# Patient Record
Sex: FEMALE | Race: WHITE | NOT HISPANIC OR LATINO | Employment: FULL TIME | URBAN - NONMETROPOLITAN AREA
[De-identification: names, ages, dates, MRNs, and addresses within clinical notes are randomized per-mention and may not be internally consistent; named-entity substitution may affect disease eponyms.]

---

## 2017-01-11 ENCOUNTER — TELEPHONE (OUTPATIENT)
Dept: FAMILY MEDICINE CLINIC | Facility: CLINIC | Age: 47
End: 2017-01-11

## 2017-01-11 DIAGNOSIS — I10 ESSENTIAL HYPERTENSION: Primary | ICD-10-CM

## 2017-01-11 DIAGNOSIS — G47.9 SLEEP DISORDER: ICD-10-CM

## 2017-01-11 RX ORDER — CLONAZEPAM 0.5 MG/1
0.5 TABLET ORAL DAILY PRN
Qty: 30 TABLET | Refills: 2 | OUTPATIENT
Start: 2017-01-11 | End: 2017-08-22 | Stop reason: SDUPTHER

## 2017-01-11 RX ORDER — LISINOPRIL AND HYDROCHLOROTHIAZIDE 25; 20 MG/1; MG/1
1 TABLET ORAL DAILY
Qty: 30 TABLET | Refills: 0 | Status: SHIPPED | OUTPATIENT
Start: 2017-01-11 | End: 2017-02-21 | Stop reason: SDUPTHER

## 2017-01-11 NOTE — TELEPHONE ENCOUNTER
----- Message from Elizabeth Walker MA sent at 1/11/2017  1:58 PM EST -----  Regarding: FW: Prescription Question  Contact: [phone]   I sent in refill on the Lisinopril. What about clonazepam?  ----- Message -----     From: Aye Aguilar     Sent: 1/11/2017   1:52 PM       To: [facility] [place]  Subject: Prescription Question                            Would you mind to refill my Lisinopril-HCTZ 20-25 and clonazepam 0.5 mg at [facility]. I will reschedule my appointment for next week before Deshawn goes back to the hospital.      Thanks!   Aye

## 2017-02-21 ENCOUNTER — OFFICE VISIT (OUTPATIENT)
Dept: FAMILY MEDICINE CLINIC | Facility: CLINIC | Age: 47
End: 2017-02-21

## 2017-02-21 VITALS
BODY MASS INDEX: 30.53 KG/M2 | HEART RATE: 78 BPM | HEIGHT: 66 IN | WEIGHT: 190 LBS | OXYGEN SATURATION: 99 % | SYSTOLIC BLOOD PRESSURE: 100 MMHG | DIASTOLIC BLOOD PRESSURE: 78 MMHG

## 2017-02-21 DIAGNOSIS — M19.90 INFLAMMATORY ARTHRITIS: ICD-10-CM

## 2017-02-21 DIAGNOSIS — F32.89 OTHER DEPRESSION: ICD-10-CM

## 2017-02-21 DIAGNOSIS — I10 ESSENTIAL HYPERTENSION: ICD-10-CM

## 2017-02-21 DIAGNOSIS — M79.7 FIBROMYALGIA: ICD-10-CM

## 2017-02-21 DIAGNOSIS — M79.641 RIGHT HAND PAIN: Primary | ICD-10-CM

## 2017-02-21 DIAGNOSIS — E66.9 ADIPOSITY: ICD-10-CM

## 2017-02-21 DIAGNOSIS — M25.531 RIGHT WRIST PAIN: ICD-10-CM

## 2017-02-21 PROCEDURE — 99214 OFFICE O/P EST MOD 30 MIN: CPT | Performed by: FAMILY MEDICINE

## 2017-02-21 RX ORDER — TOPIRAMATE 25 MG/1
75 CAPSULE, EXTENDED RELEASE ORAL DAILY
Qty: 90 EACH | Refills: 2 | Status: SHIPPED | OUTPATIENT
Start: 2017-02-21 | End: 2017-02-24

## 2017-02-21 RX ORDER — LISINOPRIL AND HYDROCHLOROTHIAZIDE 25; 20 MG/1; MG/1
1 TABLET ORAL DAILY
Qty: 30 TABLET | Refills: 5 | Status: SHIPPED | OUTPATIENT
Start: 2017-02-21 | End: 2017-09-28 | Stop reason: SDUPTHER

## 2017-02-21 RX ORDER — TOPIRAMATE 25 MG/1
1 CAPSULE, EXTENDED RELEASE ORAL DAILY
Qty: 30 CAPSULE | Refills: 5
Start: 2017-02-21 | End: 2017-02-21

## 2017-02-21 RX ORDER — PHENTERMINE HYDROCHLORIDE 37.5 MG/1
37.5 TABLET ORAL
Qty: 30 TABLET | Refills: 1 | Status: SHIPPED | OUTPATIENT
Start: 2017-02-21 | End: 2017-07-07

## 2017-02-21 NOTE — PROGRESS NOTES
Subjective   Aye Aguilar is a 46 y.o. female.     Hand Pain    The incident occurred 12 to 24 hours ago. The incident occurred at home. There was no injury mechanism. The pain is present in the right hand and right wrist. The quality of the pain is described as burning and aching. The pain radiates to the right arm. The pain is severe. The pain has been constant since the incident. Pertinent negatives include no chest pain, muscle weakness, numbness or tingling. The symptoms are aggravated by movement (gripping). She has tried immobilization and NSAIDs for the symptoms. The treatment provided no relief.     Fibromyalgia: Patient here for follow up on fibromyalgia. Patient has a several year history of soft tissue pain: diffuse. severity: fairly severe: course over time: waxing and waning but worse overall.  fatigue: severe: course over time: waxing and waning but worse overall.  depression: moderate: course over time: waxing and waning but better overall.  anxiety: moderate: course over time: waxing and waning but better overall.  headaches: severe: course over time: waxing and waning.  paresthesias: generally extermitites: severity: tolerable: course over time: waxing and waning.  IBS symptoms: severity: mild to moderate: course over time: waxing and waning but better overall.  psychosocial stressors: daughter, parent with chronic illness. Onset was gradual. The symptoms are of varying severity. They are made worse by: cold exposure and overuse. They are helped by arthritis medications, rest and current meds. The patient denies fever, rash, localizing neurologic symptoms, unexplained weight loss. Previous treatments include prescription meds - see below, Physical Therapy and Massage Therapy. Associated symptoms include alopecia, arthralgia, depression, fatigue, joint pain, memory loss, morning stiffness and muscle weakness. Patient denies associated bleeding/clotting problems, fevers, new headache, nodules, oral  ulcers, pleurisy and seizures. Evaluation to date includes extensive labs, rheumatology consult, imaging, etc. Recent interventions include no recent changes in regimen.. Limitation on activities include intermittent problems with ADLs, exercise, job performance. Patient is frequently absent from work per her report. SHe is sleeping poorly, having RLS symptoms. Moods fluctuate. Stress reaction to son's recent dx of cancer. Doing some better with pain since starting low dose gabapentin. Also currently on Cymbalta, tramadol. She has comorbid vit D def for which she is taking high dose replacement.     Hypertension: Patient here for follow-up of elevated blood pressure. She is not exercising and is fairly adherent to low salt diet. Blood pressure is well controlled at home. Cardiac symptoms fatigue and lower extremity edema. Patient denies chest pain, claudication, cough, dyspnea, exertional chest pressure/discomfort, irregular heart beat, near-syncope, palpitations and weight gain. Cardiovascular risk factors: obesity (BMI >= 30 kg/m2) and sedentary lifestyle. Use of agents associated with hypertension: estrogens and NSAIDS. History of target organ damage: none. She is taking meds as rx'd; denies side effects.    Depression: Patient here for f/u of depression. She has previously struggled with anhedonia, depressed mood, difficulty concentrating, fatigue, hopelessness, hypersomnia, impaired memory, insomnia, psychomotor retardation and weight gain. Onset was approximately several years ago, gradually improving since that time. She denies current suicidal and homicidal plan or intent. Family history significant for anxiety and depression.Possible organic causes contributing are: medications, endocrine/metabolic, neuro, nutritional. Risk factors: positive family history in  , negative life event   and previous episode of depression. She is currnelty on Cymbalta and elavil. She complains of the following side effects from  "the treatment: none.  Increased difficulty due to son's illness.      Obesity (Brief): The patient is being seen for a routine clinic follow-up of obesity. The last clinic visit was 2 month(s) ago. Symptoms: excess weight and difficulty trying to lose weight. Symptoms are waxing/waning. Exacerbating factors: stress, sugar cravings and fast foods. Relieving factors: exercise, adequate water, support activities and preparing foods. Associated symptoms: dyspnea, fatigue, edema, back pain, joint pain, depressed mood and anxiety. Current treatment includes portion control, exercise regimen, behavior modification and Trokendi, Phentermine. By report, there is good tolerance of treatment, good symptom control and Good compliance with medication, poor compliance with exercise. Fair compliance with diet. Marked psychosocial stressors. Associated conditions include abdominal obesity and suspected JUVENTINO. Disease complications: hypertension. Reported interest in weight loss is high. Diets tried in the past include low calorie and low fat. The patient exercises infrequently. Exercise includes walking. Denies side effects of current medication. Pt struggling with environmental mngt. Pleased that she is \"not thinking about food all the time\". More water intake, increased activity, better overall portion control. Working on stress mgnt.     The following portions of the patient's history were reviewed and updated as appropriate: allergies, current medications, past family history, past medical history, past social history, past surgical history and problem list.    Review of Systems   Constitutional: Positive for fatigue. Negative for chills and fever.   HENT: Negative for congestion, mouth sores, nosebleeds, rhinorrhea, sore throat and trouble swallowing.         Dry mouth   Eyes: Negative for pain, itching and visual disturbance.        Dry eyes   Respiratory: Negative.  Negative for cough, shortness of breath and wheezing.  "   Cardiovascular: Positive for leg swelling. Negative for chest pain and palpitations.   Gastrointestinal: Negative for abdominal pain, constipation, diarrhea, nausea and vomiting.   Endocrine: Positive for heat intolerance. Negative for polydipsia and polyuria.   Genitourinary: Negative.    Musculoskeletal: Positive for arthralgias and myalgias.   Skin: Negative for rash and wound.   Neurological: Positive for headaches. Negative for dizziness, tingling, tremors, seizures, syncope, weakness and numbness.   Hematological: Negative.    Psychiatric/Behavioral: Positive for dysphoric mood and sleep disturbance. Negative for confusion and suicidal ideas. The patient is nervous/anxious.      Objective    Vitals:    02/21/17 1127   BP: 100/78   Pulse: 78   SpO2: 99%     Body mass index is 30.67 kg/(m^2).  Last 2 weights    02/21/17  1127   Weight: 190 lb (86.2 kg)     Physical Exam   Constitutional: She is oriented to person, place, and time. She appears well-developed and well-nourished. She is cooperative. She does not appear ill. No distress.   HENT:   Mouth/Throat: Mucous membranes are normal. Mucous membranes are not dry.   Eyes: Conjunctivae and lids are normal.   Cardiovascular: Normal rate, regular rhythm and intact distal pulses.    Pulmonary/Chest: Effort normal and breath sounds normal.   Musculoskeletal:        Right hand: She exhibits decreased range of motion (limited , most notable in thumb), tenderness (diffuse soft tissue TTPin palm, 1st digit), bony tenderness (1st MCP) and swelling (mild). She exhibits normal capillary refill. Normal sensation noted. Decreased strength (due to pain) noted.       Vascular Status -  Her exam exhibits no right foot edema. Her exam exhibits no left foot edema.  Lymphadenopathy:     She has no cervical adenopathy.   Neurological: She is alert and oriented to person, place, and time. She has normal strength. She displays no tremor. No sensory deficit. Gait normal.   Skin:  Skin is warm and dry. No bruising and no rash noted.   Psychiatric: Her behavior is normal. Thought content normal. She exhibits a depressed mood.   Nursing note and vitals reviewed.    Most recent labs reviewed on chart.    Assessment/Plan   Aye was seen today for hand pain.    Diagnoses and all orders for this visit:    Right hand pain    Essential hypertension  -     lisinopril-hydrochlorothiazide (PRINZIDE,ZESTORETIC) 20-25 MG per tablet; Take 1 tablet by mouth Daily.    Adiposity  -     Discontinue: Topiramate ER 25 MG capsule sustained-release 24 hr; Take 1 capsule by mouth Daily. In addition to 50 mg capsule for total daily dose of 75 mg  -     phentermine (ADIPEX-P) 37.5 MG tablet; Take 1 tablet by mouth Daily. Alternate dosin/2 tablet by mouth at 10 AM and 1/2 at 3 PM.    Right wrist pain    Inflammatory arthritis    Fibromyalgia    Other depression    Other orders  -     Topiramate ER 25 MG capsule extended-release 24 hour sprinkle; Take 75 mg by mouth Daily.    Right hand/wrist pain of unclear etiology as not assoc'd with injury, repetitive use, etc. Suspect OA vs inflammatory tenosynovitis. She declines tx with corticosteroids. Will tx conservatively with ice/heat, NSAID, gentle ROM exercise. If minimal improvement over next 2-3 days, consider imaging.  HTN well controlled. Check 2-3 times per week, reported hypotension, orthostatic symptoms, etc.  FMS stable.  Depression with some worsening due to new psychosocial stressors. Managing well.  Need for cont'd lifestyle modification. Denies side effects from current meds.  Goals reviewed including: mainly water to drink, limit white flour/processed sugar, high protein, high fiber carbs, good breakfast, working toward 150 mins cardio per week, weight training 2x/week.  Encouraged compliance with Vit D.  Continue Trokendi to 75 mg qd.  F/U with Dr. Nguyen and Dr. Lopez as scheduled  [facility] report reviewed and scanned into chart. Last [facility] date  12/16/16.  As part of patient's treatment plan I am prescribing a controlled substance. The patient has been made aware of the appropriate use of such medications, including potential risk of somnolence, limited ability to drive and/or work safely, and potential for dependence and/or overdose. It has also been made clear that these medications are for use by this patient only, without concomitant use of alcohol or other substances, unless prescribed.  F/U in 2 months, sooner as needed.   Patient was encouraged to keep me informed of any acute changes, lack of improvement, or any new concerning symptoms.  Patient voiced understanding of all instructions and denied further questions.

## 2017-02-24 RX ORDER — TOPIRAMATE 25 MG/1
25 TABLET ORAL 2 TIMES DAILY
Qty: 60 TABLET | Refills: 2 | Status: SHIPPED | OUTPATIENT
Start: 2017-02-24

## 2017-03-20 RX ORDER — DULOXETIN HYDROCHLORIDE 60 MG/1
CAPSULE, DELAYED RELEASE ORAL
Qty: 60 CAPSULE | Refills: 5 | Status: SHIPPED | OUTPATIENT
Start: 2017-03-20 | End: 2017-09-28 | Stop reason: SDUPTHER

## 2017-07-06 ENCOUNTER — OFFICE VISIT (OUTPATIENT)
Dept: FAMILY MEDICINE CLINIC | Facility: CLINIC | Age: 47
End: 2017-07-06

## 2017-07-06 VITALS
OXYGEN SATURATION: 98 % | SYSTOLIC BLOOD PRESSURE: 122 MMHG | WEIGHT: 195 LBS | BODY MASS INDEX: 31.34 KG/M2 | DIASTOLIC BLOOD PRESSURE: 80 MMHG | HEIGHT: 66 IN | HEART RATE: 86 BPM

## 2017-07-06 DIAGNOSIS — M54.50 ACUTE RIGHT-SIDED LOW BACK PAIN WITHOUT SCIATICA: Primary | ICD-10-CM

## 2017-07-06 DIAGNOSIS — M79.7 FIBROMYALGIA: ICD-10-CM

## 2017-07-06 DIAGNOSIS — M19.90 INFLAMMATORY ARTHRITIS: ICD-10-CM

## 2017-07-06 DIAGNOSIS — E55.9 VITAMIN D DEFICIENCY: ICD-10-CM

## 2017-07-06 DIAGNOSIS — M62.838 MUSCLE SPASM: ICD-10-CM

## 2017-07-06 DIAGNOSIS — I10 ESSENTIAL HYPERTENSION: ICD-10-CM

## 2017-07-06 LAB
25(OH)D3+25(OH)D2 SERPL-MCNC: 23 NG/ML
ALBUMIN SERPL-MCNC: 4.4 G/DL (ref 3.5–5)
ALBUMIN/GLOB SERPL: 1.7 G/DL (ref 1–2)
ALP SERPL-CCNC: 100 U/L (ref 38–126)
ALT SERPL-CCNC: 21 U/L (ref 13–69)
AST SERPL-CCNC: 13 U/L (ref 15–46)
BILIRUB SERPL-MCNC: 0.4 MG/DL (ref 0.2–1.3)
BUN SERPL-MCNC: 13 MG/DL (ref 7–20)
BUN/CREAT SERPL: 18.6 (ref 7.1–23.5)
CALCIUM SERPL-MCNC: 10.4 MG/DL (ref 8.4–10.2)
CHLORIDE SERPL-SCNC: 98 MMOL/L (ref 98–107)
CO2 SERPL-SCNC: 30 MMOL/L (ref 26–30)
CREAT SERPL-MCNC: 0.7 MG/DL (ref 0.6–1.3)
CRP SERPL-MCNC: 2.9 MG/DL (ref 0–1)
ERYTHROCYTE [DISTWIDTH] IN BLOOD BY AUTOMATED COUNT: 14.5 % (ref 11.5–14.5)
GLOBULIN SER CALC-MCNC: 2.6 GM/DL
GLUCOSE SERPL-MCNC: 80 MG/DL (ref 74–98)
HCT VFR BLD AUTO: 39.6 % (ref 37–47)
HGB BLD-MCNC: 12.4 G/DL (ref 12–16)
MCH RBC QN AUTO: 27.9 PG (ref 27–31)
MCHC RBC AUTO-ENTMCNC: 31.3 G/DL (ref 30–37)
MCV RBC AUTO: 89 FL (ref 81–99)
PLATELET # BLD AUTO: 426 10*3/MM3 (ref 130–400)
POTASSIUM SERPL-SCNC: 3.7 MMOL/L (ref 3.5–5.1)
PROT SERPL-MCNC: 7 G/DL (ref 6.3–8.2)
RBC # BLD AUTO: 4.45 10*6/MM3 (ref 4.2–5.4)
SODIUM SERPL-SCNC: 139 MMOL/L (ref 137–145)
TSH SERPL DL<=0.005 MIU/L-ACNC: 2.65 MIU/ML (ref 0.47–4.68)
WBC # BLD AUTO: 7.91 10*3/MM3 (ref 4.8–10.8)

## 2017-07-06 PROCEDURE — 96372 THER/PROPH/DIAG INJ SC/IM: CPT | Performed by: FAMILY MEDICINE

## 2017-07-06 PROCEDURE — 99214 OFFICE O/P EST MOD 30 MIN: CPT | Performed by: FAMILY MEDICINE

## 2017-07-06 RX ORDER — METHYLPREDNISOLONE ACETATE 80 MG/ML
120 INJECTION, SUSPENSION INTRA-ARTICULAR; INTRALESIONAL; INTRAMUSCULAR; SOFT TISSUE ONCE
Status: COMPLETED | OUTPATIENT
Start: 2017-07-06 | End: 2017-07-06

## 2017-07-06 RX ORDER — METHOCARBAMOL 500 MG/1
TABLET, FILM COATED ORAL
COMMUNITY
Start: 2017-04-10

## 2017-07-06 RX ORDER — KETOROLAC TROMETHAMINE 30 MG/ML
60 INJECTION, SOLUTION INTRAMUSCULAR; INTRAVENOUS ONCE
Status: COMPLETED | OUTPATIENT
Start: 2017-07-06 | End: 2017-07-06

## 2017-07-06 RX ADMIN — METHYLPREDNISOLONE ACETATE 120 MG: 80 INJECTION, SUSPENSION INTRA-ARTICULAR; INTRALESIONAL; INTRAMUSCULAR; SOFT TISSUE at 12:37

## 2017-07-06 RX ADMIN — KETOROLAC TROMETHAMINE 60 MG: 30 INJECTION, SOLUTION INTRAMUSCULAR; INTRAVENOUS at 12:37

## 2017-07-06 NOTE — PROGRESS NOTES
Subjective   Aye Aguilar is a 46 y.o. female.     Back Pain   This is a new problem. The current episode started in the past 7 days. The problem occurs constantly. The problem has been waxing and waning (acutely worse over past 24-48 hrs) since onset. The pain is present in the lumbar spine (right side). The quality of the pain is described as aching. The pain does not radiate. The pain is severe. The symptoms are aggravated by sitting (change in position). Stiffness is present all day. Associated symptoms include headaches (chronic). Pertinent negatives include no abdominal pain, bladder incontinence, bowel incontinence, chest pain, dysuria, fever, leg pain, numbness, paresthesias, weakness or weight loss. Risk factors include sedentary lifestyle. She has tried NSAIDs, muscle relaxant, ice, heat, bed rest and analgesics for the symptoms. The treatment provided mild relief.   She has comorbidities including FMSx, inflammatory arthritis, vit D def. She feels she is having more muscle spasms in general.    Has h/o HTN. Recently controlled. Taking med as rx'd. Is on thiazide diuretic. Potassium previously low normal.    The following portions of the patient's history were reviewed and updated as appropriate: allergies, current medications, past family history, past medical history, past social history, past surgical history and problem list.    Review of Systems   Constitutional: Positive for fatigue. Negative for fever, unexpected weight change and weight loss.   Respiratory: Negative.    Cardiovascular: Negative.  Negative for chest pain.   Gastrointestinal: Negative for abdominal pain and bowel incontinence.   Genitourinary: Negative for bladder incontinence, dysuria, frequency, hematuria and urgency.   Musculoskeletal: Positive for arthralgias, back pain and myalgias.   Skin: Negative for rash.   Neurological: Positive for headaches (chronic). Negative for weakness, numbness and paresthesias.    Psychiatric/Behavioral: Positive for sleep disturbance.       Objective    Vitals:    07/06/17 1150   BP: 122/80   Pulse: 86   SpO2: 98%     Body mass index is 31.47 kg/(m^2).  Last 2 weights    07/06/17  1150   Weight: 195 lb (88.5 kg)       Physical Exam   Constitutional: She is oriented to person, place, and time. She appears well-developed and well-nourished. She is cooperative. She does not appear ill. She appears distressed (mildly due to pain).   HENT:   Mouth/Throat: Mucous membranes are normal. Mucous membranes are not dry.   Eyes: Conjunctivae and lids are normal.   Neck: Phonation normal. Neck supple. No thyroid mass and no thyromegaly present.   Cardiovascular: Normal rate, regular rhythm and intact distal pulses.    Pulmonary/Chest: Effort normal and breath sounds normal.   Musculoskeletal:        Lumbar back: She exhibits decreased range of motion, tenderness (diffuse soft tissues right side), bony tenderness (mildly L1-L3) and spasm. She exhibits no deformity.        Back:        Vascular Status -  Her exam exhibits no right foot edema. Her exam exhibits no left foot edema.  Lymphadenopathy:     She has no cervical adenopathy.   Neurological: She is alert and oriented to person, place, and time. She has normal reflexes. She displays no tremor. No sensory deficit. Gait normal.   Neg SLR bilaterally. 5/5 strength throughout except RLE with plantar flexion on right foot- due to pain more than actual weakness   Skin: Skin is warm and dry. No rash noted.   Psychiatric: She has a normal mood and affect. Her behavior is normal.   Nursing note and vitals reviewed.      Assessment/Plan   Aye was seen today for back pain.    Diagnoses and all orders for this visit:    Acute right-sided low back pain without sciatica  -     C-reactive Protein  -     methylPREDNISolone acetate (DEPO-medrol) injection 120 mg; Inject 1.5 mL into the shoulder, thigh, or buttocks 1 (One) Time.  -     ketorolac (TORADOL)  injection 60 mg; Inject 60 mg into the shoulder, thigh, or buttocks 1 (One) Time.    Essential hypertension  Comments:  Controlled. continue current medication. Low salt diet and increased exercise advised.  Orders:  -     CBC (No Diff)  -     Comprehensive Metabolic Panel  -     TSH    Vitamin D deficiency  Comments:  Unknown status. Adjust tx as indicated.  Orders:  -     Vitamin D 25 Hydroxy    Fibromyalgia    Inflammatory arthritis  -     CBC (No Diff)  -     Comprehensive Metabolic Panel  -     C-reactive Protein  -     methylPREDNISolone acetate (DEPO-medrol) injection 120 mg; Inject 1.5 mL into the shoulder, thigh, or buttocks 1 (One) Time.  -     ketorolac (TORADOL) injection 60 mg; Inject 60 mg into the shoulder, thigh, or buttocks 1 (One) Time.    Muscle spasm  -     Comprehensive Metabolic Panel  -     TSH  -     CK  -     methylPREDNISolone acetate (DEPO-medrol) injection 120 mg; Inject 1.5 mL into the shoulder, thigh, or buttocks 1 (One) Time.  -     ketorolac (TORADOL) injection 60 mg; Inject 60 mg into the shoulder, thigh, or buttocks 1 (One) Time.    I will contact patient regarding test results and provide instructions regarding any necessary changes in plan of care.  Continue conservative mngt of back pain.  Patient was encouraged to keep me informed of any acute changes, lack of improvement, or any new concerning symptoms.  Pt is aware of reasons to seek emergent care.  F/U with Dr. Nguyen and neurology as scheduled.  Patient voiced understanding of all instructions and denied further questions.

## 2017-08-22 DIAGNOSIS — G47.9 SLEEP DISORDER: ICD-10-CM

## 2017-08-22 RX ORDER — CLONAZEPAM 0.5 MG/1
0.5 TABLET ORAL DAILY PRN
Qty: 30 TABLET | Refills: 2 | OUTPATIENT
Start: 2017-08-22 | End: 2017-11-29 | Stop reason: SDUPTHER

## 2017-09-28 ENCOUNTER — OFFICE VISIT (OUTPATIENT)
Dept: FAMILY MEDICINE CLINIC | Facility: CLINIC | Age: 47
End: 2017-09-28

## 2017-09-28 VITALS
HEIGHT: 66 IN | SYSTOLIC BLOOD PRESSURE: 118 MMHG | WEIGHT: 202 LBS | HEART RATE: 76 BPM | BODY MASS INDEX: 32.47 KG/M2 | DIASTOLIC BLOOD PRESSURE: 80 MMHG | OXYGEN SATURATION: 98 %

## 2017-09-28 DIAGNOSIS — R60.1 GENERALIZED EDEMA: ICD-10-CM

## 2017-09-28 DIAGNOSIS — Z13.6 ENCOUNTER FOR LIPID SCREENING FOR CARDIOVASCULAR DISEASE: ICD-10-CM

## 2017-09-28 DIAGNOSIS — I10 ESSENTIAL HYPERTENSION: ICD-10-CM

## 2017-09-28 DIAGNOSIS — F32.89 OTHER DEPRESSION: ICD-10-CM

## 2017-09-28 DIAGNOSIS — N95.9 MENOPAUSAL AND POSTMENOPAUSAL DISORDER: ICD-10-CM

## 2017-09-28 DIAGNOSIS — Z13.220 ENCOUNTER FOR LIPID SCREENING FOR CARDIOVASCULAR DISEASE: ICD-10-CM

## 2017-09-28 DIAGNOSIS — Z23 NEED FOR INFLUENZA VACCINATION: ICD-10-CM

## 2017-09-28 DIAGNOSIS — E61.1 IRON DEFICIENCY: ICD-10-CM

## 2017-09-28 DIAGNOSIS — N95.1 POST MENOPAUSAL SYNDROME: ICD-10-CM

## 2017-09-28 DIAGNOSIS — E66.9 ADIPOSITY: Primary | ICD-10-CM

## 2017-09-28 DIAGNOSIS — E55.9 VITAMIN D DEFICIENCY: ICD-10-CM

## 2017-09-28 DIAGNOSIS — M79.7 FIBROMYALGIA: ICD-10-CM

## 2017-09-28 LAB
CHOLEST SERPL-MCNC: 181 MG/DL (ref 0–199)
HDLC SERPL-MCNC: 62 MG/DL (ref 40–60)
IRON SERPL-MCNC: 36 MCG/DL (ref 37–181)
LDLC SERPL CALC-MCNC: 102 MG/DL (ref 0–99)
TRIGL SERPL-MCNC: 83 MG/DL
VLDLC SERPL CALC-MCNC: 16.6 MG/DL

## 2017-09-28 PROCEDURE — 90471 IMMUNIZATION ADMIN: CPT | Performed by: FAMILY MEDICINE

## 2017-09-28 PROCEDURE — 90686 IIV4 VACC NO PRSV 0.5 ML IM: CPT | Performed by: FAMILY MEDICINE

## 2017-09-28 PROCEDURE — 99214 OFFICE O/P EST MOD 30 MIN: CPT | Performed by: FAMILY MEDICINE

## 2017-09-28 RX ORDER — LISINOPRIL AND HYDROCHLOROTHIAZIDE 25; 20 MG/1; MG/1
1 TABLET ORAL DAILY
Qty: 30 TABLET | Refills: 0 | Status: SHIPPED | OUTPATIENT
Start: 2017-09-28 | End: 2017-09-28 | Stop reason: SDUPTHER

## 2017-09-28 RX ORDER — DULOXETIN HYDROCHLORIDE 60 MG/1
CAPSULE, DELAYED RELEASE ORAL
Qty: 60 CAPSULE | Refills: 0 | Status: SHIPPED | OUTPATIENT
Start: 2017-09-28 | End: 2017-09-28 | Stop reason: SDUPTHER

## 2017-09-28 RX ORDER — LISINOPRIL AND HYDROCHLOROTHIAZIDE 25; 20 MG/1; MG/1
1 TABLET ORAL DAILY
Qty: 90 TABLET | Refills: 1 | Status: SHIPPED | OUTPATIENT
Start: 2017-09-28

## 2017-09-28 RX ORDER — ESTRADIOL 0.5 MG/1
0.5 TABLET ORAL
Qty: 90 TABLET | Refills: 1 | Status: SHIPPED | OUTPATIENT
Start: 2017-09-28 | End: 2017-10-02 | Stop reason: SDUPTHER

## 2017-09-28 RX ORDER — DULOXETIN HYDROCHLORIDE 60 MG/1
CAPSULE, DELAYED RELEASE ORAL
Qty: 180 CAPSULE | Refills: 1 | Status: SHIPPED | OUTPATIENT
Start: 2017-09-28

## 2017-09-28 RX ORDER — PHENTERMINE HYDROCHLORIDE 37.5 MG/1
37.5 TABLET ORAL
Qty: 30 TABLET | Refills: 0 | Status: SHIPPED | OUTPATIENT
Start: 2017-09-28 | End: 2017-11-29 | Stop reason: SDUPTHER

## 2017-09-28 RX ORDER — FUROSEMIDE 20 MG/1
TABLET ORAL
Qty: 30 TABLET | Refills: 0 | Status: SHIPPED | OUTPATIENT
Start: 2017-09-28

## 2017-09-28 NOTE — PROGRESS NOTES
Subjective   Aye Aguilar is a 46 y.o. female.     History of Present Illness   Mrs. Aguilar is here today for f/u on multiple chronic problems.  Fibromyalgia: Patient here for follow up on fibromyalgia. Patient has a several year history of soft tissue pain: diffuse. severity: fairly severe: course over time: waxing and waning but worse overall.  fatigue: severe: course over time: waxing and waning but worse overall.  depression: moderate: course over time: waxing and waning but better overall.  anxiety: moderate: course over time: waxing and waning but better overall.  headaches: severe: course over time: waxing and waning.  paresthesias: generally extermitites: severity: tolerable: course over time: waxing and waning.  IBS symptoms: severity: mild to moderate: course over time: waxing and waning but better overall.  psychosocial stressors: daughter, parent with chronic illness. Onset was gradual. The symptoms are of varying severity. They are made worse by: cold exposure and overuse. They are helped by arthritis medications, rest and current meds. The patient denies fever, rash, localizing neurologic symptoms, unexplained weight loss. Previous treatments include prescription meds - see below, Physical Therapy and Massage Therapy. Associated symptoms include alopecia, arthralgia, depression, fatigue, joint pain, memory loss, morning stiffness and muscle weakness. Patient denies associated bleeding/clotting problems, fevers, new headache, nodules, oral ulcers, pleurisy and seizures. Evaluation to date includes extensive labs, rheumatology consult, imaging, etc. Recent interventions include no recent changes in regimen.. Limitation on activities include intermittent problems with ADLs, exercise, job performance. Patient is frequently absent from work per her report. SHe is sleeping poorly, having RLS symptoms. Moods fluctuate. Doing some better with pain since starting low dose gabapentin. Also currently on  Cymbalta, tramadol. She has comorbid vit D def for which she is taking high dose replacement.      Hypertension: Patient here for follow-up of elevated blood pressure. She is not exercising and is fairly adherent to low salt diet. Blood pressure is well controlled at home. Cardiac symptoms fatigue and lower extremity edema. Patient denies chest pain, claudication, cough, dyspnea, exertional chest pressure/discomfort, irregular heart beat, near-syncope, palpitations and weight gain. Cardiovascular risk factors: obesity (BMI >= 30 kg/m2) and sedentary lifestyle. Use of agents associated with hypertension: estrogens and NSAIDS. History of target organ damage: none. She is taking meds as rx'd; denies side effects.     Depression: Patient here for f/u of depression. She has previously struggled with anhedonia, depressed mood, difficulty concentrating, fatigue, hopelessness, hypersomnia, impaired memory, insomnia, psychomotor retardation and weight gain. Onset was approximately several years ago, gradually improving since that time. She denies current suicidal and homicidal plan or intent. Family history significant for anxiety and depression.Possible organic causes contributing are: medications, endocrine/metabolic, neuro, nutritional. Risk factors: positive family history in  , negative life event   and previous episode of depression. She is currnelty on Cymbalta and elavil. She complains of the following side effects from the treatment: none.  Increased difficulty due to son's illness.       Obesity (Brief): The patient is being seen for a routine clinic follow-up of obesity. The last clinic visit was 2 month(s) ago. Symptoms: excess weight and difficulty trying to lose weight. Symptoms are waxing/waning. Exacerbating factors: stress, sugar cravings and fast foods. Relieving factors: exercise, adequate water, support activities and preparing foods. Associated symptoms: dyspnea, fatigue, edema, back pain, joint  pain, depressed mood and anxiety. Current treatment includes portion control, exercise regimen, behavior modification and Trokendi. She has taken phentermine in past. By report, there is good tolerance of treatment, poor symptom control and Good compliance with medication, poor compliance with exercise. Fair compliance with diet. Marked psychosocial stressors. Associated conditions include abdominal obesity and suspected JUVENTINO. Disease complications: hypertension. Reported interest in weight loss is high. Diets tried in the past include low calorie and low fat. The patient exercises infrequently. Exercise includes walking. Denies side effects of current medication. Pt struggling with environmental mngt. More water intake, decreased activity with change in job. Working on stress mgnt. She would like to restart phentermine.    Hormone Replacement Counseling: Patient presents to discuss hormone replacement therapy. The patient is taking hormone replacement therapy.  Patient denies post-menopausal vaginal bleeding. The patient is sexually active. Patient is hormone deficient due to hysterectomy with BSO.  The patient currently has symptoms of anxiety, depression, dry skin, moodiness, no energy. Symptoms in past had included same but symptoms have improved.  Current hormone therapy:   Estrogen: estradiol 0.5 mg   Has h/o iron def anemia; due for recheck.     The following portions of the patient's history were reviewed and updated as appropriate: allergies, current medications, past family history, past medical history, past social history, past surgical history and problem list.    Review of Systems   Constitutional: Positive for fatigue and unexpected weight change. Negative for chills and fever.   HENT: Negative for congestion, mouth sores, nosebleeds, rhinorrhea, sore throat and trouble swallowing.         Dry mouth   Eyes: Negative for pain, itching and visual disturbance.        Dry eyes   Respiratory: Negative.   Negative for cough, shortness of breath and wheezing.    Cardiovascular: Positive for leg swelling. Negative for chest pain and palpitations.   Gastrointestinal: Negative for abdominal pain, constipation, diarrhea, nausea and vomiting.   Endocrine: Positive for heat intolerance. Negative for polydipsia and polyuria.   Genitourinary: Negative.    Musculoskeletal: Positive for arthralgias and myalgias.   Skin: Negative for rash and wound.   Neurological: Positive for headaches. Negative for dizziness, tremors, seizures, syncope and weakness.   Hematological: Negative.    Psychiatric/Behavioral: Positive for dysphoric mood and sleep disturbance. Negative for confusion and suicidal ideas. The patient is nervous/anxious.        Objective    Vitals:    09/28/17 0830   BP: 118/80   Pulse: 76   SpO2: 98%     Body mass index is 32.6 kg/(m^2).  Last 2 weights    09/28/17 0830   Weight: 202 lb (91.6 kg)       Physical Exam   Constitutional: She is oriented to person, place, and time. She appears well-developed and well-nourished. She is cooperative. She does not appear ill. No distress.   HENT:   Head: Normocephalic and atraumatic.   Mouth/Throat: Oropharynx is clear and moist and mucous membranes are normal. Mucous membranes are not dry. No oral lesions.   Eyes: Conjunctivae and lids are normal.   Neck: Phonation normal. Neck supple. Normal carotid pulses present. No thyroid mass and no thyromegaly present.   Cardiovascular: Normal rate, regular rhythm, normal heart sounds and intact distal pulses.    Pulmonary/Chest: Effort normal and breath sounds normal.   Musculoskeletal:   Diffuse myofascial pain upon palpation, normal ROM but stiff       Vascular Status -  Her exam exhibits no right foot edema. Her exam exhibits no left foot edema.  Lymphadenopathy:     She has no cervical adenopathy.   Neurological: She is alert and oriented to person, place, and time. She has normal strength. She displays no tremor. No cranial nerve  deficit or sensory deficit. Gait normal.   Skin: Skin is warm and dry. No bruising and no rash noted.   Psychiatric: Her speech is normal and behavior is normal. Thought content normal. Her affect is blunt. Cognition and memory are normal.   Nursing note and vitals reviewed.      Assessment/Plan   Aye was seen today for hypertension and depression.    Diagnoses and all orders for this visit:    Adiposity    Essential hypertension  -     furosemide (LASIX) 20 MG tablet; 1 po q 2-3 days prn edema  -     Discontinue: lisinopril-hydrochlorothiazide (PRINZIDE,ZESTORETIC) 20-25 MG per tablet; Take 1 tablet by mouth Daily.  -     lisinopril-hydrochlorothiazide (PRINZIDE,ZESTORETIC) 20-25 MG per tablet; Take 1 tablet by mouth Daily.    Generalized edema  -     furosemide (LASIX) 20 MG tablet; 1 po q 2-3 days prn edema    Post menopausal syndrome  -     estradiol (ESTRACE) 0.5 MG tablet; Take 1 tablet by mouth Daily.    Vitamin D deficiency    Encounter for lipid screening for cardiovascular disease  -     Lipid Panel    Iron deficiency  -     Iron    Need for influenza vaccination  -     Flu Vaccine Quad PF 3YR+    Menopausal and postmenopausal disorder    Other depression    Fibromyalgia    Other orders  -     Discontinue: DULoxetine (CYMBALTA) 60 MG capsule; 2 pills at night  -     DULoxetine (CYMBALTA) 60 MG capsule; 2 pills at night  -     phentermine (ADIPEX-P) 37.5 MG tablet; Take 1 tablet by mouth Every Morning Before Breakfast.    HTN well controlled. Check 2-3 times per week, reported hypotension, orthostatic symptoms, etc.  FMS stable.  Depression with some worsening due to  mulitple psychosocial stressors. Managing well overall with current medications.  Need for cont'd lifestyle modification. Denies side effects from current meds.  Goals reviewed including: mainly water to drink, limit white flour/processed sugar, high protein, high fiber carbs, good breakfast, working toward 150 mins cardio per week,  weight training 2x/week.  Encouraged compliance with Vit D.  Continue Trokendi to 75 mg qd.  Add back phentermine.  [facility] report reviewed and scanned into chart. Last [facility] date 9/28/17.  As part of patient's treatment plan I am prescribing a controlled substance. The patient has been made aware of the appropriate use of such medications, including potential risk of somnolence, limited ability to drive and/or work safely, and potential for dependence and/or overdose. It has also been made clear that these medications are for use by this patient only, without concomitant use of alcohol or other substances, unless prescribed.  F/U in 1 month, sooner as needed.   Patient was encouraged to keep me informed of any acute changes, lack of improvement, or any new concerning symptoms.  Patient voiced understanding of all instructions and denied further questions.

## 2017-09-29 RX ORDER — PNV NO.95/FERROUS FUM/FOLIC AC 28MG-0.8MG
1 TABLET ORAL 2 TIMES DAILY
Qty: 60 TABLET | Refills: 1 | Status: SHIPPED | OUTPATIENT
Start: 2017-09-29

## 2017-10-02 RX ORDER — ESTRADIOL 0.5 MG/1
TABLET ORAL
Qty: 30 TABLET | Refills: 5 | Status: SHIPPED | OUTPATIENT
Start: 2017-10-02

## 2017-11-29 ENCOUNTER — TELEPHONE (OUTPATIENT)
Dept: FAMILY MEDICINE CLINIC | Facility: CLINIC | Age: 47
End: 2017-11-29

## 2017-11-29 DIAGNOSIS — G47.9 SLEEP DISORDER: ICD-10-CM

## 2017-11-29 RX ORDER — CLONAZEPAM 0.5 MG/1
0.5 TABLET ORAL DAILY PRN
Qty: 30 TABLET | Refills: 0 | OUTPATIENT
Start: 2017-11-29

## 2017-11-29 RX ORDER — PHENTERMINE HYDROCHLORIDE 37.5 MG/1
37.5 TABLET ORAL
Qty: 30 TABLET | Refills: 0 | OUTPATIENT
Start: 2017-11-29

## 2021-02-25 ENCOUNTER — IMMUNIZATION (OUTPATIENT)
Dept: VACCINE CLINIC | Facility: HOSPITAL | Age: 51
End: 2021-02-25

## 2021-02-25 PROCEDURE — 0011A: CPT | Performed by: INTERNAL MEDICINE

## 2021-02-25 PROCEDURE — 91301 HC SARSCO02 VAC 100MCG/0.5ML IM: CPT | Performed by: INTERNAL MEDICINE

## 2021-03-25 ENCOUNTER — IMMUNIZATION (OUTPATIENT)
Dept: VACCINE CLINIC | Facility: HOSPITAL | Age: 51
End: 2021-03-25

## 2021-03-25 PROCEDURE — 0012A: CPT | Performed by: INTERNAL MEDICINE

## 2021-03-25 PROCEDURE — 91301 HC SARSCO02 VAC 100MCG/0.5ML IM: CPT | Performed by: INTERNAL MEDICINE

## 2023-09-06 ENCOUNTER — TRANSCRIBE ORDERS (OUTPATIENT)
Dept: ADMINISTRATIVE | Facility: HOSPITAL | Age: 53
End: 2023-09-06
Payer: COMMERCIAL

## 2023-09-06 DIAGNOSIS — K21.9 GASTROESOPHAGEAL REFLUX DISEASE, UNSPECIFIED WHETHER ESOPHAGITIS PRESENT: Primary | ICD-10-CM

## 2023-10-10 ENCOUNTER — HOSPITAL ENCOUNTER (OUTPATIENT)
Dept: GENERAL RADIOLOGY | Facility: HOSPITAL | Age: 53
Discharge: HOME | End: 2023-10-10
Admitting: INTERNAL MEDICINE
Payer: COMMERCIAL

## 2023-10-10 DIAGNOSIS — K21.9 GASTROESOPHAGEAL REFLUX DISEASE, UNSPECIFIED WHETHER ESOPHAGITIS PRESENT: ICD-10-CM

## 2023-10-10 PROCEDURE — 74240 X-RAY XM UPR GI TRC 1CNTRST: CPT

## 2024-10-11 ENCOUNTER — TRANSCRIBE ORDERS (OUTPATIENT)
Dept: OCCUPATIONAL THERAPY | Facility: HOSPITAL | Age: 54
End: 2024-10-11
Payer: COMMERCIAL

## 2024-10-11 ENCOUNTER — TRANSCRIBE ORDERS (OUTPATIENT)
Dept: PHYSICAL THERAPY | Facility: HOSPITAL | Age: 54
End: 2024-10-11
Payer: COMMERCIAL

## 2024-10-11 DIAGNOSIS — I89.0 LYMPHEDEMA: Primary | ICD-10-CM

## 2025-03-31 ENCOUNTER — TRANSCRIBE ORDERS (OUTPATIENT)
Dept: PHYSICAL THERAPY | Facility: HOSPITAL | Age: 55
End: 2025-03-31
Payer: COMMERCIAL

## 2025-03-31 DIAGNOSIS — I89.0 LYMPHEDEMA: Primary | ICD-10-CM

## 2025-04-24 ENCOUNTER — HOSPITAL ENCOUNTER (OUTPATIENT)
Dept: PHYSICAL THERAPY | Facility: HOSPITAL | Age: 55
Setting detail: THERAPIES SERIES
Discharge: HOME | End: 2025-04-24
Payer: COMMERCIAL

## 2025-04-24 DIAGNOSIS — I89.0 LYMPHEDEMA: Primary | ICD-10-CM

## 2025-04-24 PROCEDURE — 97162 PT EVAL MOD COMPLEX 30 MIN: CPT

## 2025-04-24 PROCEDURE — 97140 MANUAL THERAPY 1/> REGIONS: CPT

## 2025-04-25 NOTE — THERAPY EVALUATION
Physical Therapy Lymphedema Initial Evaluation and Treatment Note   [facility]     Patient Name: Aye Aguilar  : 1970  MRN: [id number]  Today's Date: 2025      Visit Date: 2025    Visit Dx:    ICD-10-CM ICD-9-CM   1. Lymphedema  I89.0 457.1       Patient Active Problem List   Diagnosis    Central obesity    Clonus    Depression    Fibromyalgia    Muscle weakness    Gastroesophageal reflux disease    Headache    Hypertension    Irritable bowel syndrome    Migraine    Nausea    Neck pain    Adiposity    Peripheral neuropathy    Menopausal and postmenopausal disorder    Sleep disorder    Vitamin D deficiency    Inflammatory arthritis    High risk medication use    Iron deficiency        Past Medical History:   Diagnosis Date    Arthritis     Depression     Erythromelalgia     Gallstones     History of renal calculi     Kidney stones     Ovarian cyst     Postmenopausal HRT (hormone replacement therapy)     Pregnancy             Past Surgical History:   Procedure Laterality Date    BILATERAL SALPINGO OOPHORECTOMY Bilateral     CHOLECYSTECTOMY      HYSTERECTOMY      Complete       Visit Dx:    ICD-10-CM ICD-9-CM   1. Lymphedema  I89.0 457.1        Patient History       Row Name 25 1400 25 1414          History    Chief Complaint Balance Problems;Dizziness;Falls/history of falls;Fatigue/poor endurance;Headache;Joint stiffness;Joint swelling;Muscle tenderness;Muscle weakness;Numbness;Pain;Swelling;Tightness  -CA Balance Problems;Dizziness;Falls/history of falls;Fatigue/poor endurance;Headache;Joint stiffness;Joint swelling;Muscle tenderness;Muscle weakness;Numbness;Pain;Swelling;Tightness (P)    -patient     Type of Pain Back pain;Knee pain;Lower Extremity / Leg;Upper Extremity / Arm  -CA Back pain;Knee pain;Lower Extremity / Leg;Upper Extremity / Arm (P)    -patient     Brief Description of Current Complaint Pt has struggled with lipedema for years. It took many years to get a  dx for her painful legs that have a cuff appearance. She has received treatment before acquiring compression as best as able with insurance limits and acquiring a flexitouch pump. She notes active exercise and dieting to assist with weight loss. She is down 40# from a few years ago. Despite weight loss, she continues to have tender legs that swell. She returns to therapy to refresh her home care, update her compression, and to have her spouse help her with self MLD. She notes the vibration plate improves her symptoms. She likes to use the compression pump but notes it is very combersome to prepare and uncomfortable with long term wear.  -CA Swelling in legs and feet, pain, painful nodules (P)    -patient     Patient/Caregiver Goals Relieve pain;Improve mobility;Improve strength;Know what to do to help the symptoms;Decrease swelling  -CA Relieve pain;Improve mobility;Improve strength;Know what to do to help the symptoms;Decrease swelling (P)    -patient     Hand Dominance right-handed  -CA right-handed (P)    -patient     Occupation/sports/leisure activities Physician billing, walking, riding assault bike  -CA Physician billing, walking, riding assault bike (P)    -patient     Patient seeing anyone else for problem(s)? Yes  -CA Yes (P)    -patient     Are you or can you be pregnant No  -CA No (P)    -patient        Pain     What Performance Factors Make the Current Problem(s) WORSE? dependency  -CA --     What Performance Factors Make the Current Problem(s) BETTER? vibration plate, use of her compression pump.  -CA --        Fall Risk Assessment    Any falls in the past year: No  -CA No (P)    -patient     Factors that contributed to the fall: Lost balance;Fatigue  -CA Lost balance;Fatigue (P)    -patient        Services    Prior Rehab/Home Health Experiences Yes  -CA Yes (P)    -patient     Are you currently receiving Home Health services No  -CA No (P)    -patient     Do you plan to receive Home Health services in  the near future No  -CA No (P)    -patient        Daily Activities    Primary Language English  -CA English (P)    -patient     Are you able to read Yes  -CA Yes (P)    -patient     Are you able to write Yes  -CA Yes (P)    -patient     How does patient learn best? Listening;Reading;Demonstration  -CA Listening;Reading;Demonstration (P)    -patient     Pt Participated in POC and Goals Yes  -CA --        Safety    Are you being hurt, hit, or frightened by anyone at home or in your life? No  -CA No (P)    -patient     Are you being neglected by a caregiver No  -CA No (P)    -patient     Have you had any of the following issues with Depression;Anxiety;Panic Attacks  -CA Depression;Anxiety;Panic Attacks (P)    -patient               User Key  (r) = Recorded By, (t) = Taken By, (c) = Cosigned By      Initials Name Provider Type    Holley Schilling, PT Physical Therapist    patient Aye Aguilar --                     Lymphedema       Row Name 04/24/25 1400             Subjective Pain    Able to rate subjective pain? yes  -CA      Subjective Pain Comment does not rate  -CA         Subjective    Subjective Comments See eval  -CA         Lymphedema Assessment    Lymphedema Classification RUE:;LUE:;RLE:;LLE:;stage 1 (Spontaneously Reversible);secondary  Lymphedema related to lipedema  -CA      Lymphedema Assessment Comments Pt demonstrates cuffing along the B ankles, pre-tibial fat tissue deposits, medial distal thigh early prominence, tenderness to light pressures, easy bruising, a h/o diet resistance fat tissue in the waist to LE regions all indicating lipedema involvement. Pitting edema is present, indicate lipolymphedema  -CA         Posture/Observations    Posture- WNL Posture is WNL  -CA         General ROM    GENERAL ROM COMMENTS WFL  -CA         MMT (Manual Muscle Testing)    General MMT Comments WFL  -CA         Lymphedema Edema Assessment    Ptting Edema Category By severity  -CA      Pitting Edema Mild   -CA      Stemmer Sign bilateral:;negative  -CA      Langley Hump bilateral:;negative  -CA      Edema Assessment Comment Mild pitting along the distal lower legs, Very tender to testing pressures.  -CA         Skin Changes/Observations    Location/Assessment Lower Extremity  -CA      Lower Extremity Conditions bilateral:;intact  -CA      Lower Extremity Color/Pigment bilateral:;hyperpigmented  -CA      Skin Observations Comment Skin demonstrates a lumpy, uneven pattern along the thighs. and cuffing along the lower legs.  -CA         Lymphedema Sensation    Lymphedema Sensation Reports RLE:;LLE:  hypersensitivity  -CA         Lymphedema Pulses/Capillary Refill    Lymphedema Pulses/Capillary Refill lower extremity pulses;capillary refill  -CA      Dorsalis Pedis Pulse right:;left:;+2 normal  -CA      Posterior Tibialis Pulse right:;left:;+2 normal  -CA         Lymphedema Measurements    Measurement Type(s) Volumetric;Circumferential  -CA      Volumetric Areas Lower extremities  -CA      Circumferential Areas Trunk  -CA      Volumetric LE Distance interval (cm) 10  -CA      Lymphedema Measurements Comments MCP L 23, R 24.2  -CA         LLE Volumetric (cm)    Reference Point 0 27  -CA      10 29.5 cm  -CA      20 38 cm  -CA      30 41 cm  -CA      40 42 cm  -CA      50 47.5 cm  -CA      60 53 cm  -CA      70 60 cm  -CA      LLE Volume Calculation: 10cm 47602.7  -CA         RLE Volumetric (cm)    Reference Point 0 22.6  -CA      10 26.5 cm  -CA      20 38 cm  -CA      30 39.5 cm  -CA      40 40 cm  -CA      50 74 cm  -CA      60 56 cm  -CA      70 60 cm  -CA      RLE Volume Calculation- 10cm 32004.8  -CA         Trunk Circumferential (cm)    Measurement Location 1 K  -CA      Trunk 1 96.5 cm  -CA      Measurement Location 2 H  -CA      Trunk 2 105.5 cm  -CA      Measurement Location 3 T  -CA      Trunk 3 94 cm  -CA      Trunk Circumferential Total 296 cm  -CA         Manual Lymphatic Drainage    Manual Lymphatic Drainage  initial sequence;opened regional lymph nodes;opened anastamoses;extremity treatment  -CA      Initial Sequence supraclavicular;shoulder collectors;abdomen;diaphragmatic breathing  -CA      Supraclavicular right;left  -CA      Shoulder Collectors right;left  -CA      Abdomen superficial  -CA      Opened Regional Lymph Nodes inguinal  -CA      Inguinal left;right  -CA      Opened Anastamoses inguino-axillary  -CA      Inguino-Axillary right;left  -CA      Extremity Treatment MLD to full limb  -CA      MLD to Full Limb B LE  -CA      Manual Therapy Specific education to pt and spouse. Advised on monitoring for body mechanics and how to set up treatment at home.  -CA         Compression/Skin Care    Compression/Skin Care Comments Pt does not have compression with her at this time. She is using exercise elastic material to help control her edema.  -CA                User Key  (r) = Recorded By, (t) = Taken By, (c) = Cosigned By      Initials Name Provider Type    Holley Schilling PT Physical Therapist                    Therapy Education  Education Details: Pt was educated on dx, POC, and pathophysiology. Discussed needs: pt wishes to try to upgrade her compression pump, acquire new compression items, and address home care skills.  Given: HEP, Symptoms/condition management, Edema management  Program: New  How Provided: Verbal, Demonstration, Written  Provided to: Patient (spouse)  Level of Understanding: Verbalized       OP Exercises       Row Name 04/24/25 1400             Subjective    Subjective Comments See eval  -CA         Subjective Pain    Able to rate subjective pain? yes  -CA      Subjective Pain Comment does not rate  -CA         Total Minutes    31564 - PT Manual Therapy Minutes 30  -CA                User Key  (r) = Recorded By, (t) = Taken By, (c) = Cosigned By      Initials Name Provider Type    Holley Schilling PT Physical Therapist                    Manual Rx (Last 36 Hours)       Manual  Treatments       Row Name 04/24/25 1400             Total Minutes    33964 - PT Manual Therapy Minutes 30  -CA                User Key  (r) = Recorded By, (t) = Taken By, (c) = Cosigned By      Initials Name Provider Type    Holley Schilling, PT Physical Therapist                     PT OP Goals       Row Name 04/24/25 1400          PT Short Term Goals    STG Date to Achieve 06/06/25  -CA     STG 1 Pt to be IND in application of compression to assist with edema management  -CA     STG 1 Progress New  -CA     STG 2 Pt to be provided the option for an upgraded compression pump to assist with long term self care.  -CA     STG 2 Progress New  -CA        Long Term Goals    LTG Date to Achieve 07/18/25  -CA     LTG 1 Pt to be IND with CDT phase 2 care to assist with long term edema management.  -CA     LTG 1 Progress New  -CA     LTG 2 Pt to demostrate reduction of the L by 300 cc to assist with garment fit and function.  -CA     LTG 2 Progress New  -CA     LTG 3 Pt to be provided the option for long term compression to assist with CDT phase 2 IND care.  -CA     LTG 3 Progress New  -CA     LTG 4 Pt to demostrate reduction of the R by 300 cc to assist with garment fit and function.  -CA     LTG 4 Progress New  -CA        Time Calculation    PT Goal Re-Cert Due Date 07/18/25  -CA               User Key  (r) = Recorded By, (t) = Taken By, (c) = Cosigned By      Initials Name Provider Type    Holley Schilling, PT Physical Therapist                     PT Assessment/Plan       Row Name 04/24/25 1400          PT Assessment    Functional Limitations Performance in self-care ADL;Limitation in home management  -CA     Impairments Edema  -CA     Assessment Comments Pt presents with stage 2 lipedema of the waist and legs with early involvement of the UEs. She also presents with early lymphedema indicators. She would benefit from PT to assist with fluid mobility, progression to IND management, acquiring items needed for  self care, and long term planning.  -CA     Please refer to paper survey for additional self-reported information Yes  -CA     Rehab Potential Good  -CA     Patient/caregiver participated in establishment of treatment plan and goals Yes  -CA     Patient would benefit from skilled therapy intervention Yes  -CA        PT Plan    PT Frequency 1x/week  -CA     Predicted Duration of Therapy Intervention (PT) 12 weeks  -CA     Planned CPT's? PT EVAL MOD COMPLELITY: 15264;PT THER PROC EA 15 MIN: 45741;PT THER ACT EA 15 MIN: 34491;PT MANUAL THERAPY EA 15 MIN: 90966;PT SELF CARE/HOME MGMT/TRAIN EA 15: 45662;PT INITIAL ORTHOTIC MGMT/TRAIN EA 15 MIN: 81330;PT SUBSEQUENT ORTHOTIC/PROSTHETIC TRAIN: 72164  -CA     PT Plan Comments Begin therapy per POC above.  -CA               User Key  (r) = Recorded By, (t) = Taken By, (c) = Cosigned By      Initials Name Provider Type    Holley Schilling, AGUSTÍN Physical Therapist                       Outcome Measure Options: Lower Extremity Functional Scale (LEFS)  Lower Extremity Functional Index  Any of your usual work, housework or school activities: A little bit of difficulty  Your usual hobbies, recreational or sporting activities: Moderate difficulty  Getting into or out of the bath: No difficulty  Walking between rooms: No difficulty  Putting on your shoes or socks: No difficulty  Squatting: A little bit of difficulty  Lifting an object, like a bag of groceries from the floor: A little bit of difficulty  Performing light activities around your home: No difficulty  Performing heavy activities around your home: Moderate difficulty  Getting into or out of a car: No difficulty  Walking 2 blocks: A little bit of difficulty  Walking a mile: A little bit of difficulty  Going up or down 10 stairs (about 1 flight of stairs): A little bit of difficulty  Standing for 1 hour: A little bit of difficulty  Sitting for 1 hour: Moderate difficulty  Running on even ground: A little bit of  difficulty  Running on uneven ground: A little bit of difficulty  Making sharp turns while running fast: No difficulty  Hopping: A little bit of difficulty  Rolling over in bed: No difficulty  Total: 64      Time Calculation:   Start Time: 1400  Stop Time: 1510  Time Calculation (min): 70 min  Timed Charges  67893 - PT Manual Therapy Minutes: 30  Untimed Charges  PT Eval/Re-eval Minutes: 40  Total Minutes  Timed Charges Total Minutes: 30  Untimed Charges Total Minutes: 40   Total Minutes: 70   Time calculation does not account for 30 minutes documentation time and providing resources to pt via email      Therapy Charges for Today       Code Description Service Date Service Provider Modifiers Qty    22543326103 HC PT MANUAL THERAPY EA 15 MIN 4/24/2025 Holley Jenkins, PT GP 2    70113855988 HC-PT EVAL MOD COMPLEXITY 5 4/24/2025 Holley Jenkins, PT  1            PT G-Codes  Outcome Measure Options: Lower Extremity Functional Scale (LEFS)  Total: 64         Holley Jenkins PT  4/25/2025

## 2025-05-16 ENCOUNTER — APPOINTMENT (OUTPATIENT)
Dept: PHYSICAL THERAPY | Facility: HOSPITAL | Age: 55
End: 2025-05-16
Payer: COMMERCIAL

## 2025-05-19 ENCOUNTER — HOSPITAL ENCOUNTER (OUTPATIENT)
Dept: PHYSICAL THERAPY | Facility: HOSPITAL | Age: 55
Setting detail: THERAPIES SERIES
End: 2025-05-19
Payer: COMMERCIAL

## 2025-06-24 ENCOUNTER — HOSPITAL ENCOUNTER (OUTPATIENT)
Dept: PHYSICAL THERAPY | Facility: HOSPITAL | Age: 55
Setting detail: THERAPIES SERIES
Discharge: HOME | End: 2025-06-24
Payer: COMMERCIAL

## 2025-06-24 DIAGNOSIS — I89.0 LYMPHEDEMA: Primary | ICD-10-CM

## 2025-06-24 PROCEDURE — 97535 SELF CARE MNGMENT TRAINING: CPT

## 2025-06-24 NOTE — THERAPY PROGRESS REPORT/RE-CERT
Outpatient Physical Therapy Lymphedema Progress Note   [place]     Patient Name: Aye Aguilar  : 1970  MRN: [id number]  Today's Date: 2025        Visit Date: 2025    Visit Dx:    ICD-10-CM ICD-9-CM   1. Lymphedema  I89.0 457.1       Patient Active Problem List   Diagnosis    Central obesity    Clonus    Depression    Fibromyalgia    Muscle weakness    Gastroesophageal reflux disease    Headache    Hypertension    Irritable bowel syndrome    Migraine    Nausea    Neck pain    Adiposity    Peripheral neuropathy    Menopausal and postmenopausal disorder    Sleep disorder    Vitamin D deficiency    Inflammatory arthritis    High risk medication use    Iron deficiency         Lymphedema       Row Name 25 1300             Subjective Pain    Able to rate subjective pain? yes  -CA      Subjective Pain Comment does not rate  -CA         Subjective    Subjective Comments Pt has struggled to attend due to life demands. She continues to wear her compression but  notes its old and her pain persists with direct touch. She has not been able to discuss surgical plans with surgery offices.  -CA         Lymphedema Assessment    Lymphedema Classification RUE:;LUE:;RLE:;LLE:;stage 1 (Spontaneously Reversible);secondary  Lymphedema related to lipedema  -CA      Lymphedema Assessment Comments Pt demonstrates cuffing along the B ankles, pre-tibial fat tissue deposits, medial distal thigh early prominence, tenderness to light pressures, easy bruising, a h/o diet resistance fat tissue in the waist to LE regions all indicating lipedema involvement. Pitting edema is present, indicate lipolymphedema  -CA         Posture/Observations    Posture- WNL Posture is WNL  -CA         Lymphedema Edema Assessment    Ptting Edema Category By severity  -CA      Pitting Edema Mild  -CA      Stemmer Sign bilateral:;negative  -CA      Strang Hump bilateral:;negative  -CA         Skin Changes/Observations     Location/Assessment Lower Extremity  -CA      Lower Extremity Conditions bilateral:;intact  -CA      Lower Extremity Color/Pigment bilateral:;hyperpigmented  -CA      Skin Observations Comment Skin demonstrates a lumpy, uneven pattern along the thighs. and cuffing along the lower legs.  -CA         Lymphedema Sensation    Lymphedema Sensation Reports RLE:;LLE:  hypersensitivity  -CA         Lymphedema Pulses/Capillary Refill    Lymphedema Pulses/Capillary Refill lower extremity pulses;capillary refill  -CA      Dorsalis Pedis Pulse right:;left:;+2 normal  -CA      Posterior Tibialis Pulse right:;left:;+2 normal  -CA         Lymphedema Measurements    Measurement Type(s) Volumetric;Circumferential  -CA      Volumetric Areas Lower extremities  -CA      Circumferential Areas Trunk  -CA      Volumetric LE Distance interval (cm) 10  -CA         LLE Volumetric (cm)    Reference Point 0 24.5  -CA      10 29.5 cm  -CA      20 39 cm  -CA      30 43 cm  -CA      40 45 cm  -CA      50 49 cm  -CA      60 54.5 cm  -CA      70 60 cm  -CA      LLE Volume Calculation: 10cm 02597.8  -CA         RLE Volumetric (cm)    Reference Point 0 22.5  -CA      10 27 cm  -CA      20 37 cm  -CA      30 40 cm  -CA      40 41 cm  -CA      50 47.5 cm  -CA      60 54 cm  -CA      70 61 cm  -CA      RLE Volume Calculation- 10cm 48875.5  -CA         Trunk Circumferential (cm)    Measurement Location 1 K  -CA      Trunk 1 100 cm  -CA      Measurement Location 2 H  -CA      Trunk 2 107 cm  -CA      Measurement Location 3 T  -CA      Trunk 3 98 cm  -CA      Trunk Circumferential Total 305 cm  -CA         Compression/Skin Care    Compression/Skin Care Comments Pt is wearing her compression INDLy at home  -CA                User Key  (r) = Recorded By, (t) = Taken By, (c) = Cosigned By      Initials Name Provider Type    CA Holley Jenkins PT Physical Therapist                   PT Assessment/Plan       Row Name 06/24/25 1300          PT Assessment     Functional Limitations Performance in self-care ADL;Limitation in home management  -CA     Impairments Edema  -CA     Assessment Comments The patient has undergone 4 weeks or more of conservative therapy management for lymphedema (I89.0). Despite consistency with activity/exercise, elevation, and compression, this patient continues to demonstrate chronic, persistent , and progressive lymphedema, showing no signs of resolution. This patient struggles with hyperpigmentation and pain as a result of the lymphedema and remains at risk for complications including infections and wounds and progression. PT is recommending a pneumatic compression pump as medically necessary to assist with long term home care and self-management. PT is recommending a  compression pump due to to pt's unique characteristic which warrants a manually adjustable compression pump for individualized pressure adjustments across multiple chambers to achieve the required compression gradient which is not possible with a  non-calibrated pump. PT is also recommending the biopant option given the presence of her edema along the lower abdominal/glut region. She is progressing towards her goals which remain appropriate.  -CA     Please refer to paper survey for additional self-reported information No  -CA     Rehab Potential Good  -CA     Patient/caregiver participated in establishment of treatment plan and goals Yes  -CA     Patient would benefit from skilled therapy intervention Yes  -CA        PT Plan    PT Frequency 1x/week  -CA     Predicted Duration of Therapy Intervention (PT) 4 more weeks in POC  -CA     PT Plan Comments PT to request a compression pump through [employer] for the improved functionality.  -CA               User Key  (r) = Recorded By, (t) = Taken By, (c) = Cosigned By      Initials Name Provider Type    Holley Schilling, PT Physical Therapist                        OP Exercises       Row Name 06/24/25 1300              Subjective    Subjective Comments Pt has struggled to attend due to life demands. She continues to wear her compression but  notes its old and her pain persists with direct touch. She has not been able to discuss surgical plans with surgery offices.  -CA         Subjective Pain    Able to rate subjective pain? yes  -CA      Subjective Pain Comment does not rate  -CA                User Key  (r) = Recorded By, (t) = Taken By, (c) = Cosigned By      Initials Name Provider Type    Holley Schilling, PT Physical Therapist                     PT OP Goals       Row Name 06/24/25 1300          PT Short Term Goals    STG Date to Achieve 06/06/25  -CA     STG 1 Pt to be IND in application of compression to assist with edema management  -CA     STG 1 Progress Progressing  -CA     STG 2 Pt to be provided the option for an upgraded compression pump to assist with long term self care.  -CA     STG 2 Progress Progressing  -CA        Long Term Goals    LTG Date to Achieve 07/18/25  -CA     LTG 1 Pt to be IND with CDT phase 2 care to assist with long term edema management.  -CA     LTG 1 Progress Progressing  -CA     LTG 2 Pt to demostrate reduction of the L by 300 cc to assist with garment fit and function.  -CA     LTG 2 Progress Progressing  -CA     LTG 3 Pt to be provided the option for long term compression to assist with CDT phase 2 IND care.  -CA     LTG 3 Progress Partially Met  -CA     LTG 4 Pt to demostrate reduction of the R by 300 cc to assist with garment fit and function.  -CA     LTG 4 Progress Progressing  -CA        Time Calculation    PT Goal Re-Cert Due Date 07/18/25  -CA               User Key  (r) = Recorded By, (t) = Taken By, (c) = Cosigned By      Initials Name Provider Type    Holley Schilling PT Physical Therapist                    Therapy Education  Education Details: Substantial education on surgical planning and initial interviews with surgical groups that she may be interested in.  Substantial education on pt's staging and lipedema vs lymphedema as well as combined lipolymphedema. Pt is very emotional distressed by her condition and the impact her condition has on her life. Reviewed possible changes to her compression pump, garment options and insurance coverage.Significant time planning her needs given her insurance roll over in July. Demonstrated the  compression pump features and donning. Pt feels that this option would be much more functional for home needs and that she would be able to use it more frequently.  Given: HEP, Symptoms/condition management, Edema management  Program: New  How Provided: Verbal, Demonstration, Written  Provided to: Patient (spouse)  Level of Understanding: Verbalized  36533 - PT Self Care/Mgmt Minutes: 55       Time Calculation:   Start Time: 1300  Stop Time: 1400  Time Calculation (min): 60 min  Timed Charges  59593 - PT Self Care/Mgmt Minutes: 55  Total Minutes  Timed Charges Total Minutes: 55   Total Minutes: 55   Therapy Charges for Today       Code Description Service Date Service Provider Modifiers Qty    88863546902 HC PT SELF CARE/MGMT/TRAIN EA 15 MIN 6/24/2025 Holley Jenkins, PT GP 4                      Holley Jenkins PT  6/24/2025